# Patient Record
Sex: FEMALE | Race: OTHER | URBAN - METROPOLITAN AREA
[De-identification: names, ages, dates, MRNs, and addresses within clinical notes are randomized per-mention and may not be internally consistent; named-entity substitution may affect disease eponyms.]

---

## 2017-01-20 ENCOUNTER — OFFICE VISIT (OUTPATIENT)
Dept: VASCULAR SURGERY | Age: 57
End: 2017-01-20

## 2017-01-20 VITALS
SYSTOLIC BLOOD PRESSURE: 120 MMHG | HEART RATE: 66 BPM | RESPIRATION RATE: 16 BRPM | HEIGHT: 64 IN | BODY MASS INDEX: 23.05 KG/M2 | DIASTOLIC BLOOD PRESSURE: 64 MMHG | WEIGHT: 135 LBS

## 2017-01-20 DIAGNOSIS — I83.812 VARICOSE VEINS OF LEFT LOWER EXTREMITY WITH PAIN: Primary | ICD-10-CM

## 2017-01-20 NOTE — MR AVS SNAPSHOT
Visit Information Date & Time Provider Department Dept. Phone Encounter #  
 1/20/2017 11:30 AM Jody Hampton, [address] [facility] [phone] [id number] Your Appointments 2/3/2017  9:30 AM  
INJECTION with MD VADIM Sousa [facility] ([facility]) Appt Note: 2ND INJECTION  
 [address] [address]  
[phone]  
  
   
 [address] [address] Upcoming Health Maintenance Date Due Hepatitis C Screening 1960 DTaP/Tdap/Td series (1 - Tdap) 8/28/1983 PAP AKA CERVICAL CYTOLOGY 8/28/1983 BREAST CANCER SCRN MAMMOGRAM 8/28/2012 FOBT Q 1 YEAR AGE 50-75 8/28/2012 INFLUENZA AGE 9 TO ADULT 8/1/2016 Allergies as of 1/20/2017  Review Complete On: 1/20/2017 By: Tres Murray LPN No Known Allergies Current Immunizations  Never Reviewed No immunizations on file. Not reviewed this visit Vitals BP Pulse Resp Height(growth percentile) Weight(growth percentile) BMI  
 120/64 (BP 1 Location: Left arm, BP Patient Position: Sitting) 66 16 5' 4\" (1.626 m) 135 lb (61.2 kg) 23.17 kg/m2 Smoking Status Former Smoker Vitals History BMI and BSA Data Body Mass Index Body Surface Area  
 23.17 kg/m 2 1.66 m 2 Your Updated Medication List  
  
   
This list is accurate as of: 1/20/17 12:37 PM.  Always use your most recent med list.  
  
  
  
  
 LORazepam 1 mg tablet Commonly known as:  ATIVAN Take as directed and bring to procedure  
  
 multivitamin tablet Commonly known as:  ONE A DAY Take 1 Tab by mouth daily. Please provide this summary of care documentation to your next provider. If you have any questions after today's visit, please call [phone].

## 2017-01-20 NOTE — PROGRESS NOTES
She is here today for treatment for some pronounced branch varicosities, painful branches of saphenous left leg. Isolated 70s in the posterior calf popliteal fossa and posterior thigh with an alcohol prep today foamy a sclera injection to the sites she tolerated this very nicely applied a light topical steroid and wrap. If she has any further sites she can return for further injection sclerotherapy.   She is happy with her outcomes her original painful saphenous vein has been resolved is happy to hear that

## 2017-02-03 ENCOUNTER — OFFICE VISIT (OUTPATIENT)
Dept: VASCULAR SURGERY | Age: 57
End: 2017-02-03

## 2017-02-03 VITALS
SYSTOLIC BLOOD PRESSURE: 118 MMHG | WEIGHT: 135 LBS | DIASTOLIC BLOOD PRESSURE: 60 MMHG | BODY MASS INDEX: 23.05 KG/M2 | HEART RATE: 70 BPM | HEIGHT: 64 IN | RESPIRATION RATE: 16 BRPM

## 2017-02-03 DIAGNOSIS — I83.812 VARICOSE VEINS OF LEFT LOWER EXTREMITY WITH PAIN: Primary | ICD-10-CM

## 2017-02-03 NOTE — PROGRESS NOTES
Posterior leg varicose veins treated today with foamy asclera  Pt happy with results, pain has totally resolved  Tolerated well  Will likeley follow up at later time for some spider veins    Information provided and pt understands

## 2017-02-03 NOTE — MR AVS SNAPSHOT
Visit Information Date & Time Provider Department Dept. Phone Encounter #  
 2/3/2017  9:30 AM Louis Pascual, [address] [facility] [phone] [id number] Upcoming Health Maintenance Date Due Hepatitis C Screening 1960 DTaP/Tdap/Td series (1 - Tdap) 8/28/1983 PAP AKA CERVICAL CYTOLOGY 8/28/1983 BREAST CANCER SCRN MAMMOGRAM 8/28/2012 FOBT Q 1 YEAR AGE 50-75 8/28/2012 INFLUENZA AGE 9 TO ADULT 8/1/2016 Allergies as of 2/3/2017  Review Complete On: 2/3/2017 By: Louis Pascual MD  
 No Known Allergies Current Immunizations  Never Reviewed No immunizations on file. Not reviewed this visit You Were Diagnosed With   
  
 Codes Comments Varicose veins of left lower extremity with pain    -  Primary ICD-10-CM: V62.795 ICD-9-CM: 454.8 Vitals BP Pulse Resp Height(growth percentile) Weight(growth percentile) BMI  
 118/60 (BP 1 Location: Left arm, BP Patient Position: Sitting) 70 16 5' 4\" (1.626 m) 135 lb (61.2 kg) 23.17 kg/m2 Smoking Status Former Smoker Vitals History BMI and BSA Data Body Mass Index Body Surface Area  
 23.17 kg/m 2 1.66 m 2 Your Updated Medication List  
  
   
This list is accurate as of: 2/3/17 10:08 AM.  Always use your most recent med list.  
  
  
  
  
 LORazepam 1 mg tablet Commonly known as:  ATIVAN Take as directed and bring to procedure  
  
 multivitamin tablet Commonly known as:  ONE A DAY Take 1 Tab by mouth daily. We Performed the Following INJECTION THERAPY VEIN,MULT VEINS [56600 CPT(R)] Please provide this summary of care documentation to your next provider. If you have any questions after today's visit, please call [phone].